# Patient Record
Sex: FEMALE | NOT HISPANIC OR LATINO | URBAN - METROPOLITAN AREA
[De-identification: names, ages, dates, MRNs, and addresses within clinical notes are randomized per-mention and may not be internally consistent; named-entity substitution may affect disease eponyms.]

---

## 2021-10-11 ENCOUNTER — OFFICE VISIT (OUTPATIENT)
Dept: DENTISTRY | Facility: CLINIC | Age: 41
End: 2021-10-11

## 2021-10-11 VITALS — HEART RATE: 96 BPM | DIASTOLIC BLOOD PRESSURE: 75 MMHG | SYSTOLIC BLOOD PRESSURE: 110 MMHG

## 2021-10-11 DIAGNOSIS — K02.9 CARIES: Primary | ICD-10-CM

## 2021-10-11 PROCEDURE — D0210 INTRAORAL - COMPLETE SERIES OF RADIOGRAPHIC IMAGES: HCPCS | Performed by: DENTIST

## 2022-04-18 ENCOUNTER — OFFICE VISIT (OUTPATIENT)
Dept: DENTISTRY | Facility: CLINIC | Age: 42
End: 2022-04-18

## 2022-04-18 VITALS — HEART RATE: 91 BPM | DIASTOLIC BLOOD PRESSURE: 69 MMHG | SYSTOLIC BLOOD PRESSURE: 102 MMHG

## 2022-04-18 DIAGNOSIS — K02.9 CARIES: Primary | ICD-10-CM

## 2022-04-18 PROCEDURE — D4355 FULL MOUTH DEBRIDEMENT TO ENABLE A COMPREHENSIVE ORAL EVALUATION AND DIAGNOSIS ON A SUBSEQUENT VISIT: HCPCS | Performed by: DENTIST

## 2022-04-18 RX ORDER — AMOXICILLIN 500 MG/1
500 CAPSULE ORAL EVERY 8 HOURS SCHEDULED
Qty: 21 CAPSULE | Refills: 0 | Status: SHIPPED | OUTPATIENT
Start: 2022-04-18 | End: 2022-04-25

## 2022-04-19 NOTE — PROGRESS NOTES
Pt presents to clinic for full mouth debridement with a chief complaint of pain in the upper left  Medical history reviewed, no changes  Pt c/o random pain and pain on biting in the upper right  Tooth #2 previously planned for endo therapy  New PA and BW taken  Findings: Deep mesial caries and severe bone loss  Explained to pt tooth has a poor prognosis, and due to bone level and decay, recommended ext #2  Pt agrees and states she prefers that tx over endo, post/core, crown  Explained we will refer her to OS for the ext as the roots are curved and in very close proximity to the sinus  Referral printed and handed to pt along with her radiographs  Offered to complete full mouth debridement today while the pt is here  Pt consents  Full mouth debridement completed with cavitron to remove supragingival calculus using a cavitron and hand scalers  Pt has very sensitive teeth and heavy subgingival calculus build up  Explained she will most definitely need a a deep cleaning and at the next visit we will get probing depths to send to her insurance  Pt understands  Prescription for amoxicillin sent to her pharmacy  Pt left satisfied and ambulatory       Nv: Post debridement eval  Ww: Dr Gonzalo Browne

## 2023-12-18 ENCOUNTER — APPOINTMENT (OUTPATIENT)
Dept: URGENT CARE | Facility: MEDICAL CENTER | Age: 43
End: 2023-12-18
Payer: OTHER MISCELLANEOUS

## 2023-12-18 PROCEDURE — 99213 OFFICE O/P EST LOW 20 MIN: CPT | Performed by: PHYSICIAN ASSISTANT

## 2023-12-20 ENCOUNTER — APPOINTMENT (OUTPATIENT)
Dept: URGENT CARE | Facility: MEDICAL CENTER | Age: 43
End: 2023-12-20
Payer: OTHER MISCELLANEOUS

## 2023-12-20 PROCEDURE — 99213 OFFICE O/P EST LOW 20 MIN: CPT | Performed by: PHYSICIAN ASSISTANT

## 2024-11-24 NOTE — PROGRESS NOTES
COMP EXAM, FMX, PROBE EXAM   REVIEWED MED HX: meds, allergies, health changes reviewed in EPIC  Meds: Remeron, Abilify and Omeprazole, Cymbalta, Imitrex as needed, Buspar -all meds reported added to [facility] medication list  CHIEF CONCERN:     -Last dental visit was here 2022   - pt reports UR tooth has hole, occasional discomfort/ food catch   - pt points to upper front tooth and reports very sensitive  PAIN SCALE:  0  ASA CLASS:  ASA II  PLAQUE:  heavy  CALCULUS:  Heavy  BLEEDING:  heavy  STAIN :  Moderate  PERIO: 3/C    Visual and Tactile Intraoral/ Extraoral evaluation: Oral and Oropharyngeal cancer evaluation. No findings     Dr. Avalos -  Reviewed with patient clinical and radiographic findings and patient verbalized understanding. All questions and concerns addressed.   -informed pt of following:  # 3, 4 deep decay. Will require RCT to save. Will pre Auth and reappt pt for palliative tx.   Informed pt of moderate periodontal disease showed pt bone loss on radiographs and stressed importance of sc/rp 4 quads. Will pre auth. Discussed sliding fee scale.  Pt informed #12 fracture line buccal. Will reappt and check depth crack.     REFERRALS: none    CARIES FINDINGS: see tooth chart       NEXT VISIT:   1)start with UR -palliative TX till pre Auth received.   2) scale/ rp all 4 quads- will need 4 visits, heavy calculus   3) finalize TX plan   Last FMX : 10/11/21

## 2024-11-25 ENCOUNTER — OFFICE VISIT (OUTPATIENT)
Dept: DENTISTRY | Facility: CLINIC | Age: 44
End: 2024-11-25

## 2024-11-25 VITALS — SYSTOLIC BLOOD PRESSURE: 112 MMHG | DIASTOLIC BLOOD PRESSURE: 74 MMHG | HEART RATE: 89 BPM

## 2024-11-25 DIAGNOSIS — Z01.20 ENCOUNTER FOR DENTAL EXAM AND CLEANING W/O ABNORMAL FINDINGS: Primary | ICD-10-CM

## 2024-11-25 PROCEDURE — D0210 INTRAORAL - COMPLETE SERIES OF RADIOGRAPHIC IMAGES: HCPCS

## 2024-11-25 PROCEDURE — D0150 COMPREHENSIVE ORAL EVALUATION - NEW OR ESTABLISHED PATIENT: HCPCS

## 2024-11-25 RX ORDER — BUSPIRONE HYDROCHLORIDE 30 MG/1
30 TABLET ORAL 2 TIMES DAILY
COMMUNITY

## 2024-11-25 RX ORDER — DULOXETIN HYDROCHLORIDE 60 MG/1
60 CAPSULE, DELAYED RELEASE ORAL DAILY
COMMUNITY

## 2024-11-25 RX ORDER — MIRTAZAPINE 15 MG/1
15 TABLET, FILM COATED ORAL
COMMUNITY

## 2024-11-25 RX ORDER — SUMATRIPTAN 50 MG/1
50 TABLET, FILM COATED ORAL ONCE AS NEEDED
COMMUNITY

## 2024-11-27 ENCOUNTER — OFFICE VISIT (OUTPATIENT)
Dept: DENTISTRY | Facility: CLINIC | Age: 44
End: 2024-11-27

## 2024-11-27 VITALS — HEART RATE: 92 BPM | SYSTOLIC BLOOD PRESSURE: 119 MMHG | DIASTOLIC BLOOD PRESSURE: 78 MMHG

## 2024-11-27 DIAGNOSIS — K04.02 SYMPTOMATIC IRREVERSIBLE PULPITIS: Primary | ICD-10-CM

## 2024-11-27 PROCEDURE — D9110 PALLIATIVE (EMERGENCY) TREATMENT OF DENTAL PAIN - MINOR PROCEDURE: HCPCS

## 2024-11-27 NOTE — PROGRESS NOTES
Procedure Details  4  - PALLIATIVE (EMERGENCY) TREATMENT OF DENTAL PAIN - MINOR PROCEDURE    Palliative Endo Access #4    Prachi Farr 44 y.o. female presents with self to [place] for palliative endo access #4.  PMH reviewed, no changes, ASA II. Significant medical history: reviewed (in downloads folder). Significant allergies: NKA. Significant medications: n/a.  Pain level 8/10.    Diagnosis:  Pulpal - Irreversible Pulpitis  Periapical - Symptomatic apical periodontitis    Prognosis:  fair    Consent:  Risks of specific procedure: pain, swelling, infection, tooth fracture, perforation of tooth, chemical accident, breakage of endodontic instruments within canal, no guarantee against extraction, etc.  Risks of any dental procedure: post procedural pain or sensitivity, local anesthetic side effects, allergic reaction to dental materials and medications, breakage of local anesthetic needle, aspiration of small dental tools, injury to nearby hard and soft tissues and anatomical structures.  Benefits: relieve pain or underlying infection, attempt to save tooth, etc.  Alternatives: extraction or no tx.  Tx plan for palliative endo access #4 reviewed. Opportunity to ask questions given, all questions answered to degree of medical and dental certainty.  Patient understands and consent given by self via signed endodontic tx informed consent.    Anesthesia:  Topical 20% benzocaine.  1 carp lidocaine 2% w 1:100,000 epi administered via buccal infiltration.    Procedure details:  Clamp and rubber dam placed.   Pulp chamber accessed and caries removed with round carbide.  Access prep refined with Endo-Z bur.  Number of canals: 2.  Location of canal(s): buccal and palatal.  Debrided canal(s) with size 10 and 15 hand file.  Irrigated with 6% NaOCl.  Placed Calcium hydroxide and cotton pellet, restored with IRM, and adjusted occlusion.    Patient dismissed ambulatory and alert.    NV: finish RCT # 4.   #3 is also planned for  palliative, but patient was told to call if she is having pain on that tooth to schedule for palliative #3- if no pain, then recommended she finish #4 before starting #3.    Attending: Dr. Callahan checked access .

## 2024-12-12 PROBLEM — F41.9 ANXIETY: Status: ACTIVE | Noted: 2024-12-12

## 2024-12-12 PROBLEM — K21.9 GERD (GASTROESOPHAGEAL REFLUX DISEASE): Status: ACTIVE | Noted: 2024-12-12

## 2024-12-13 ENCOUNTER — OFFICE VISIT (OUTPATIENT)
Dept: DENTISTRY | Facility: CLINIC | Age: 44
End: 2024-12-13

## 2024-12-13 VITALS — HEART RATE: 90 BPM | DIASTOLIC BLOOD PRESSURE: 75 MMHG | SYSTOLIC BLOOD PRESSURE: 111 MMHG

## 2024-12-13 DIAGNOSIS — K04.02 SYMPTOMATIC IRREVERSIBLE PULPITIS: Primary | ICD-10-CM

## 2024-12-13 PROCEDURE — D9110 PALLIATIVE (EMERGENCY) TREATMENT OF DENTAL PAIN - MINOR PROCEDURE: HCPCS

## 2024-12-13 NOTE — PROGRESS NOTES
Palliative Endo Access #3    Prachi Farr 44 y.o. female presents with self to [place] for palliative endo access #3.  PMH reviewed, no changes, ASA II. Significant medical history: GERD and anxiety. Significant allergies: NKDA. Significant medications: Duloxetine, sumatriptan, buspirone, mirtazapine.  Pain level 4/10.    Diagnosis:  Pulpal - Irreversible Pulpitis  Periapical - Symptomatic apical periodontitis    Prognosis:  guarded    Consent:  Risks of specific procedure: pain, swelling, infection, tooth fracture, perforation of tooth, chemical accident, breakage of endodontic instruments within canal, no guarantee against extraction.  Risks of any dental procedure: post procedural pain or sensitivity, local anesthetic side effects, allergic reaction to dental materials and medications, breakage of local anesthetic needle, aspiration of small dental tools, injury to nearby hard and soft tissues and anatomical structures.  Benefits: Relieve pain or underlying infection and attempt to save tooth.  Alternatives: extraction or no tx.  Tx plan for palliative endo access #3 reviewed. Opportunity to ask questions given, all questions answered to degree of medical and dental certainty.  Patient understands and consent given by self via verbal consent and signed endodontic tx informed consent.    Anesthesia:  Topical 20% benzocaine.  2 carps 2% Lidocaine 1:100k epi via buccal infiltration and palatal/lingual infiltration.    Procedure details:  Clamp and rubber dam placed.   Pulp chamber accessed and caries removed with round carbide.  Access prep refined with Endo-Z bur.  Number of canals: 4.  Location of canals: MB#1, MB2, DB, Palatal.  Debrided canals with size #10 hand file.  Irrigated with 6% NaOCl.  Placed Calcium hydroxide and cotton pellet, restored with IRM, and adjusted occlusion.    Patient dismissed ambulatory and alert.    NV: Will need RCT #4 (#4 palliative was done on 11/27 with Dr. Thompson) once pre-auth  returns. RCT #3 will also need to be done after #4.     Attending: Dr. Callahan was present in clinic.

## 2025-05-02 ENCOUNTER — TELEPHONE (OUTPATIENT)
Dept: DENTISTRY | Facility: CLINIC | Age: 45
End: 2025-05-02

## 2025-05-02 NOTE — TELEPHONE ENCOUNTER
Spoke to patient about pre-auth that we submitted. Made patient aware that per her insurance she was denied for both SRPS on all 4 quadrants and also denied RCT. I did let the patient know prior to continuing that she herself can appeal the insurance decision or she can apply for our financial assistance program (SFS). Patient stated she is going to call her insurance and appeal their decision.